# Patient Record
(demographics unavailable — no encounter records)

---

## 2024-10-08 NOTE — DEVELOPMENTAL MILESTONES
[Transitions between sitting and lying] : transitions between sitting and lying [Crawls] : crawls [Picks up small objects with 3 fingers] : picks up small objects with 3 fingers and thumb [Normal Development] : Normal Development [None] : none [Says "Leeroy" or "Mama"] : says "Leeroy" or "Mama" nonspecifically [FreeTextEntry1] : Pulling to stand

## 2024-10-08 NOTE — PHYSICAL EXAM
[Alert] : alert [Acute Distress] : no acute distress [Normocephalic] : normocephalic [Flat Open Anterior [place]] : flat open anterior fontanelle [Red Reflex] : red reflex bilateral [Excessive Tearing] : no excessive tearing [PERRL] : PERRL [Normally Placed Ears] : normally placed ears [Auricles Well Formed] : auricles well formed [Clear Tympanic membranes] : clear tympanic membranes [Light reflex present] : light reflex present [Bony landmarks visible] : bony landmarks visible [Discharge] : no discharge [Nares Patent] : nares patent [Palate Intact] : palate intact [Uvula Midline] : uvula midline [Supple, full passive range of motion] : supple, full passive range of motion [Palpable Masses] : no palpable masses [Symmetric Chest Rise] : symmetric chest rise [Clear to Auscultation Bilaterally] : clear to auscultation bilaterally [Regular Rate and Rhythm] : regular rate and rhythm [S1, S2 present] : S1, S2 present [Murmurs] : no murmurs [+2 Femoral Pulses] : (+) 2 femoral pulses [Soft] : soft [Tender] : nontender [Distended] : nondistended [Bowel Sounds] : bowel sounds present [Hepatomegaly] : no hepatomegaly [Splenomegaly] : no splenomegaly [Normal External Genitalia] : normal external genitalia [Clitoromegaly] : no clitoromegaly [Normal Vaginal Introitus] : normal vaginal introitus [No Abnormal Lymph Nodes Palpated] : no abnormal lymph nodes palpated [Symmetric abduction and rotation of hips] : symmetric abduction and rotation of hips [Allis Sign] : negative Allis sign [Straight] : straight [Cranial Nerves Grossly Intact] : cranial nerves grossly intact [Rash or Lesions] : no rash/lesions [de-identified] : dry skin behind ears

## 2024-10-08 NOTE — HISTORY OF PRESENT ILLNESS
[Mother] : mother [Formula ___ oz/feed] : [unfilled] oz of formula per feed [___ Feeding per 24 hrs] : a total of [unfilled] feedings is 24 hours [Normal] : Normal [In Crib] : sleeps in crib [Rear facing car seat in  back seat] : Rear facing car seat in  back seat [Delayed] : delayed [Wakes up at night] : does not wake up at night [de-identified] : Eggs, squash, avocado, carrots, oatmeal [de-identified] : missed 6 mo appt

## 2024-10-08 NOTE — DISCUSSION/SUMMARY
[Family Adaptation] : family adaptation [Infant [place]] : infant independence [Feeding Routine] : feeding routine [Safety] : safety [] : The components of the vaccine(s) to be administered today are listed in the plan of care. The disease(s) for which the vaccine(s) are intended to prevent and the risks have been discussed with the caretaker.  The risks are also included in the appropriate vaccination information statements which have been provided to the patient's caregiver.  The caregiver has given consent to vaccinate. [FreeTextEntry1] : 9 month old infant here for WCC. Missed 6 mo WCC. Eating solids well and formula. Meeting developmental milestones. Hips feel more symmetric today, equal leg length and neg allis sign. - continue ad isa feeds - monitor for minimum 4 voids per day - return for stools colored red/gray/black - encouraged safe sleep practice - [id number], PCV20, and flu today. Return in 1 mo for flu #2 and Hep B #3 - Then RTC 3mo for 12mo WCC

## 2024-11-24 NOTE — HISTORY OF PRESENT ILLNESS
[de-identified] : Fever [FreeTextEntry6] : Fever and cough Brothers sick and she was last to get sick Seems to hurt when she coughs Congestion

## 2024-11-24 NOTE — DISCUSSION/SUMMARY
[FreeTextEntry1] : 10 month old w fever x 1 day and cough. Lungs are clear. Likely viral syndrome. Continue supportive care.  Addendum 11/23 - Pt RVP resulted positive for RSV but both brothers also tested positive for mycoplasma. discussed w mom will treat patient as well jade dorman due to mycoplasma exposure.   Go to ER if the child develops difficulty breathing, seizures, decreased urination, change in level of consciousness.

## 2024-11-24 NOTE — HISTORY OF PRESENT ILLNESS
[de-identified] : Fever [FreeTextEntry6] : Fever and cough Brothers sick and she was last to get sick Seems to hurt when she coughs Congestion

## 2024-11-24 NOTE — PHYSICAL EXAM
[Cerumen in canal] : cerumen in canal [Bilateral] : (bilateral) [NL] : warm, clear [FreeTextEntry3] : unsuccessful removal of cerumen but partial view appears nl

## 2025-01-15 NOTE — HISTORY OF PRESENT ILLNESS
[Mother] : mother [Formula ___ oz/feed] : [unfilled] oz of formula per feed [Fruit] : fruit [Normal] : Normal [Brushing teeth] : Brushing teeth [Vitamin] : Primary Fluoride Source: Vitamin [No] : No cigarette smoke exposure [Car seat in back seat] : Car seat in back seat [Delayed] : Delayed

## 2025-01-15 NOTE — DEVELOPMENTAL MILESTONES
[Normal Development] : Normal Development [None] : none [Says "Dad" or "Mom" with meaning] : says "Dad" or "Mom" with meaning [Uses one word other than Mom or] : uses one word other than Mom or Dad or personal names [Takes first independent] : takes first independent steps [Stands without support] : stands without support [Picks up small object with 2 finger] : picks up small object with 2 finger pincer grasp [Picks up food and eats it] : picks up food and eats it

## 2025-01-17 NOTE — DISCUSSION/SUMMARY
[Family Support] : family support [Establishing Routines] : establishing routines [Feeding and Appetite Changes] : feeding and appetite changes [Establishing A Dental Home] : establishing a dental home [Safety] : safety [] : The components of the vaccine(s) to be administered today are listed in the plan of care. The disease(s) for which the vaccine(s) are intended to prevent and the risks have been discussed with the caretaker.  The risks are also included in the appropriate vaccination information statements which have been provided to the patient's caregiver.  The caregiver has given consent to vaccinate. [FreeTextEntry1] :  CRISTIN is a 12 mo old female here for 12 mo WCC. Growing and developing well. No parental concerns.  Discussed first dental visit, stopping bottles. Vaccines today: MMR, VZV, influenza #2 Needs to return for Hep A#1 and Hep B#3, then will be up to date. CBC and lead screen ordered to lab.  Return for 15 mo WCC.

## 2025-01-17 NOTE — PHYSICAL EXAM
[Alert] : alert [Normocephalic] : normocephalic [Closed Anterior [place]] : closed anterior fontanelle [Red Reflex] : red reflex bilateral [PERRL] : PERRL [Normally Placed Ears] : normally placed ears [Auricles Well Formed] : auricles well formed [Clear Tympanic membranes] : clear tympanic membranes [Light reflex present] : light reflex present [Bony landmarks visible] : bony landmarks visible [Discharge] : no discharge [Nares Patent] : nares patent [Palate Intact] : palate intact [Uvula Midline] : uvula midline [Tooth Eruption] : tooth eruption [Supple, full passive range of motion] : supple, full passive range of motion [Palpable Masses] : no palpable masses [Symmetric Chest Rise] : symmetric chest rise [Clear to Auscultation Bilaterally] : clear to auscultation bilaterally [Regular Rate and Rhythm] : regular rate and rhythm [S1, S2 present] : S1, S2 present [Murmurs] : no murmurs [+2 Femoral Pulses] : (+) 2 femoral pulses [Soft] : soft [Tender] : nontender [Distended] : nondistended [Bowel Sounds] : normoactive bowel sounds [Hepatomegaly] : no hepatomegaly [Splenomegaly] : no splenomegaly [Normal External Genitalia] : normal external genitalia [Clitoromegaly] : no clitoromegaly [Normal Vaginal Introitus] : normal vaginal introitus [No Abnormal Lymph Nodes Palpated] : no abnormal lymph nodes palpated [Symmetric Abduction and Rotation of Hips] : symmetric abduction and rotation of hips [Allis Sign] : negative Allis sign [Straight] : straight [Cranial Nerves Grossly Intact] : cranial nerves grossly intact [Rash or Lesions] : no rash/lesions

## 2025-01-17 NOTE — PHYSICAL EXAM
[Alert] : alert [Normocephalic] : normocephalic [Closed Anterior [place]] : closed anterior fontanelle [Red Reflex] : red reflex bilateral [PERRL] : PERRL [Normally Placed Ears] : normally placed ears [Auricles Well Formed] : auricles well formed [Clear Tympanic membranes] : clear tympanic membranes [Light reflex present] : light reflex present [Bony landmarks visible] : bony landmarks visible [Discharge] : no discharge [Nares Patent] : nares patent [Palate Intact] : palate intact [Uvula Midline] : uvula midline [Tooth Eruption] : tooth eruption [Supple, full passive range of motion] : supple, full passive range of motion [Palpable Masses] : no palpable masses [Symmetric Chest Rise] : symmetric chest rise [Clear to Auscultation Bilaterally] : clear to auscultation bilaterally [Regular Rate and Rhythm] : regular rate and rhythm [S1, S2 present] : S1, S2 present [Murmurs] : no murmurs [+2 Femoral Pulses] : (+) 2 femoral pulses [Soft] : soft [Tender] : nontender [Distended] : nondistended [Bowel Sounds] : normoactive bowel sounds [Hepatomegaly] : no hepatomegaly [Splenomegaly] : no splenomegaly [Normal External Genitalia] : normal external genitalia [Clitoromegaly] : no clitoromegaly [Normal Vaginal Introitus] : normal vaginal introitus [No Abnormal Lymph Nodes Palpated] : no abnormal lymph nodes palpated [Symmetric Abduction and Rotation of Hips] : symmetric abduction and rotation of hips [Allis Sign] : negative Allis sign [Straight] : straight [Cranial Nerves Grossly Intact] : cranial nerves grossly intact [Rash or Lesions] : no rash/lesions

## 2025-02-10 NOTE — HISTORY OF PRESENT ILLNESS
[de-identified] : Vomiting and diarrhea [FreeTextEntry6] : Sat night was warm, threw up 3x Sun was ok then had another back throw up at night and diarrhea has felt warm cant tell if she has fever drinking water, milk, some soup normal wet diapers, no diarrhea today brother had stomach virus about a week ago

## 2025-02-10 NOTE — DISCUSSION/SUMMARY
[FreeTextEntry1] : Well appearing well hydrated 13 month old w likely viral gastroenteritis. Continue current management - she is keeping down food and drink, need to let virus run its course. Call back or seek care if new or worsening symptoms, warning signs discussed.

## 2025-02-10 NOTE — HISTORY OF PRESENT ILLNESS
[de-identified] : Vomiting and diarrhea [FreeTextEntry6] : Sat night was warm, threw up 3x Sun was ok then had another back throw up at night and diarrhea has felt warm cant tell if she has fever drinking water, milk, some soup normal wet diapers, no diarrhea today brother had stomach virus about a week ago

## 2025-04-30 NOTE — DISCUSSION/SUMMARY
[FreeTextEntry1] : 15 mo old here for hospital discharge follow up, was admitted for UTI and observation, had extreme fussiness/pain requiring IV pain meds, had normal abd US and CT abdomen. Pain improved eventually with abx for E coli UTI. Completing course of Keflex Urine culture pansensitive e coli CBC with neutropenia UA with protein/elevated UPC  Plan : in 2 weeks to go to lab for CBC, UA/UPC, TFTs (had CT w contrast) F/u at 15 mo WCC.

## 2025-04-30 NOTE — PHYSICAL EXAM
[NL] : moves all extremities x4, warm, well perfused x4 [de-identified] : erythematous diaper rash

## 2025-04-30 NOTE — HISTORY OF PRESENT ILLNESS
[FreeTextEntry6] : Fever Thurs-Fri Fri AM went to ER bc she had fever and was screaming in pain In ED had UA concerning for UTI Started on abx. US abdomen in ED for intus was negative Admitted for obs bc of severe fussiness and pain  Did complete abd US, and eventually CT abdomen with contrast bc still having severe pain w toradol and morphine   Rash on Sunday - thought to be viral exanthem/roseola   Baby is feeling much better, back to herself, eating well

## 2025-05-14 NOTE — DEVELOPMENTAL MILESTONES
[Normal Development] : Normal Development [None] : none [Points to ask for something] : points to ask for something or to get help [Uses 3 words other than names] : uses 3 words other than names [Speaks in sounds that seem like] : speaks in sounds that seem like an unknown language [Begins to run] : begins to run [FreeTextEntry1] : hi,  oh, kimberly, mommy, grandma, grandpa

## 2025-05-14 NOTE — HISTORY OF PRESENT ILLNESS
[Mother] : mother [Normal] : Normal [Brushing teeth] : Brushing teeth [Vitamin] : Primary Fluoride Source: Vitamin [Car seat in back seat] : Car seat in back seat [No] : Not at  exposure [de-identified] : good eater

## 2025-05-14 NOTE — PHYSICAL EXAM
[Alert] : alert [No Acute Distress] : no acute distress [Normocephalic] : normocephalic [Anterior [place] Closed] : anterior fontanelle closed [Red Reflex Bilateral] : red reflex bilateral [PERRL] : PERRL [Normally Placed Ears] : normally placed ears [Auricles Well Formed] : auricles well formed [Clear Tympanic membranes with present light reflex and bony landmarks] : clear tympanic membranes with present light reflex and bony landmarks [No Discharge] : no discharge [Nares Patent] : nares patent [Palate Intact] : palate intact [Uvula Midline] : uvula midline [Tooth Eruption] : tooth eruption  [Supple, full passive range of motion] : supple, full passive range of motion [No Palpable Masses] : no palpable masses [Symmetric Chest Rise] : symmetric chest rise [Clear to Auscultation Bilaterally] : clear to auscultation bilaterally [Regular Rate and Rhythm] : regular rate and rhythm [S1, S2 present] : S1, S2 present [No Murmurs] : no murmurs [+2 Femoral Pulses] : +2 femoral pulses [Soft] : soft [NonTender] : non tender [Non Distended] : non distended [Normoactive Bowel Sounds] : normoactive bowel sounds [No Hepatomegaly] : no hepatomegaly [No Splenomegaly] : no splenomegaly [Cash 1] : Cash 1 [No Clitoromegaly] : no clitoromegaly [Normal Vaginal Introitus] : normal vaginal introitus [Patent] : patent [Normally Placed] : normally placed [No Abnormal Lymph Nodes Palpated] : no abnormal lymph nodes palpated [No Clavicular Crepitus] : no clavicular crepitus [Negative Ontiveros-Ortalani] : negative Ontiveros-Ortalani [Symmetric Buttocks Creases] : symmetric buttocks creases [No Spinal Dimple] : no spinal dimple [NoTuft of Hair] : no tuft of hair [Cranial Nerves Grossly Intact] : cranial nerves grossly intact [No Rash or Lesions] : no rash or lesions

## 2025-05-14 NOTE — DISCUSSION/SUMMARY
[Communication and Social Development] : communication and social development [Sleep Routines and Issues] : sleep routines and issues [Temper Tantrums and Discipline] : temper tantrums and discipline [Healthy Teeth] : healthy teeth [Safety] : safety [] : The components of the vaccine(s) to be administered today are listed in the plan of care. The disease(s) for which the vaccine(s) are intended to prevent and the risks have been discussed with the caretaker.  The risks are also included in the appropriate vaccination information statements which have been provided to the patient's caregiver.  The caregiver has given consent to vaccinate. [FreeTextEntry1] : CRISTIN  is a 16 mo old girl here for 15 mo WCC. Growing and developing well.  Discussed first dental visit, stopping bottles.  - continue ad isa feeds and diversifying diet - no bottle use, encourage sippy cup/regular cup - limit milk intake to 16-18oz every day to avoid cow's milk anemia - encourage safe sleep practice - reviewed car safety - limit screen time to 1hr per day - encourage verbal development with reading and singing - brush teeth 2x/d - vaccines given today: PCV and Dtap/IPV/HIb  Will follow up labs ordered as hosp f/u (TFTs from CT w contrast, and UA due to proteinuria) Return for 18 mo C